# Patient Record
Sex: FEMALE | Race: WHITE | Employment: FULL TIME | URBAN - METROPOLITAN AREA
[De-identification: names, ages, dates, MRNs, and addresses within clinical notes are randomized per-mention and may not be internally consistent; named-entity substitution may affect disease eponyms.]

---

## 2018-09-05 ENCOUNTER — HOSPITAL ENCOUNTER (OUTPATIENT)
Age: 43
Discharge: HOME | End: 2018-09-05
Payer: COMMERCIAL

## 2018-09-05 LAB
TSH SERPL DL<=0.05 MIU/L-ACNC: 1.32 UIU/ML (ref 0.27–4.2)
VITAMIN D 25-HYDROXY: 32 NG/ML (ref 30–100)

## 2018-09-05 PROCEDURE — 82306 VITAMIN D 25 HYDROXY: CPT

## 2018-09-05 PROCEDURE — 84443 ASSAY THYROID STIM HORMONE: CPT

## 2018-09-05 PROCEDURE — 36415 COLL VENOUS BLD VENIPUNCTURE: CPT

## 2020-03-03 ENCOUNTER — OFFICE VISIT (OUTPATIENT)
Dept: PRIMARY CARE CLINIC | Age: 45
End: 2020-03-03
Payer: COMMERCIAL

## 2020-03-03 VITALS
BODY MASS INDEX: 22.16 KG/M2 | RESPIRATION RATE: 22 BRPM | HEART RATE: 90 BPM | DIASTOLIC BLOOD PRESSURE: 67 MMHG | WEIGHT: 133 LBS | TEMPERATURE: 98.3 F | HEIGHT: 65 IN | OXYGEN SATURATION: 99 % | SYSTOLIC BLOOD PRESSURE: 108 MMHG

## 2020-03-03 LAB
INFLUENZA A ANTIGEN, POC: NORMAL
INFLUENZA B ANTIGEN, POC: NORMAL

## 2020-03-03 PROCEDURE — G8420 CALC BMI NORM PARAMETERS: HCPCS | Performed by: NURSE PRACTITIONER

## 2020-03-03 PROCEDURE — G8427 DOCREV CUR MEDS BY ELIG CLIN: HCPCS | Performed by: NURSE PRACTITIONER

## 2020-03-03 PROCEDURE — 87804 INFLUENZA ASSAY W/OPTIC: CPT | Performed by: NURSE PRACTITIONER

## 2020-03-03 PROCEDURE — G8484 FLU IMMUNIZE NO ADMIN: HCPCS | Performed by: NURSE PRACTITIONER

## 2020-03-03 PROCEDURE — 99213 OFFICE O/P EST LOW 20 MIN: CPT | Performed by: NURSE PRACTITIONER

## 2020-03-03 PROCEDURE — 1036F TOBACCO NON-USER: CPT | Performed by: NURSE PRACTITIONER

## 2020-03-03 RX ORDER — PREDNISONE 20 MG/1
20 TABLET ORAL 2 TIMES DAILY
Qty: 10 TABLET | Refills: 0 | Status: SHIPPED | OUTPATIENT
Start: 2020-03-03 | End: 2020-03-08

## 2020-03-03 RX ORDER — OXYBUTYNIN CHLORIDE 15 MG/1
TABLET, EXTENDED RELEASE ORAL
COMMUNITY
Start: 2018-11-19

## 2020-03-03 RX ORDER — LEVOTHYROXINE SODIUM 100 MCG
88 TABLET ORAL
COMMUNITY
Start: 2020-01-27

## 2020-03-03 RX ORDER — VITAMIN B COMPLEX
1 CAPSULE ORAL DAILY
COMMUNITY

## 2020-03-03 RX ORDER — DOXYCYCLINE HYCLATE 100 MG
100 TABLET ORAL 2 TIMES DAILY
Qty: 20 TABLET | Refills: 0 | Status: SHIPPED | OUTPATIENT
Start: 2020-03-03 | End: 2020-03-13

## 2020-03-03 SDOH — HEALTH STABILITY: MENTAL HEALTH: HOW OFTEN DO YOU HAVE A DRINK CONTAINING ALCOHOL?: MONTHLY OR LESS

## 2020-03-03 ASSESSMENT — PATIENT HEALTH QUESTIONNAIRE - PHQ9
1. LITTLE INTEREST OR PLEASURE IN DOING THINGS: 0
SUM OF ALL RESPONSES TO PHQ QUESTIONS 1-9: 0
2. FEELING DOWN, DEPRESSED OR HOPELESS: 0
SUM OF ALL RESPONSES TO PHQ QUESTIONS 1-9: 0
SUM OF ALL RESPONSES TO PHQ9 QUESTIONS 1 & 2: 0

## 2020-03-03 NOTE — PROGRESS NOTES
Chief Complaint   Cough; Fatigue; Otalgia (right ear); Generalized Body Aches; and Fever (started 2 weeks ago)    History of Present Illness   Source of history provided by:  patient. Shai Akhtar is a 40 y.o. old female who has a past medical history of: No past medical history on file. Presents to the [facility] with complaints of a nonproductive cough, fever, body aches, chills, mild nausea, sore throat, and nasal congestion/drainage. She was on medrol dose pack starting the 19 of February as well as tessalon pearles. She tells me that the cough has continues since that time. She also reports that she has a low back pain. She has been taking nyquil at night to help with the cough. She denies shortness of breath. States symptoms have been present x 14 days. States symptoms have progressed since onset. Denies any CP, SOB, abdominal pain, vomiting, diarrhea, rash, or lethargy. Has been taking no other medication without symptomatic relief. Denies any hx of asthma, COPD, or tobacco use. ROS   Pertinent positives and negatives are stated within HPI, all other systems reviewed and are negative. No past surgical history on file. Social History:  reports that she has never smoked. She has never used smokeless tobacco. She reports previous alcohol use. She reports that she does not use drugs. Family History: family history is not on file. Allergies: Sulfa antibiotics    Physical Exam      VS:  /67 (Site: Right Upper Arm, Position: Sitting, Cuff Size: Medium Adult)   Pulse 90   Temp 98.3 °F (36.8 °C)   Resp 22   Ht 5' 5\" (1.651 m)   Wt 133 lb (60.3 kg)   SpO2 99%   Breastfeeding No   BMI 22.13 kg/m²    Oxygen Saturation Interpretation: Normal.    Constitutional:  Alert, development consistent with age. NAD. Head:  NC/NT. Airway patent. Ears: TMs wnl bilaterally. Canals without exudate or swelling bilaterally. Nose: mild congestion of the nasal mucosa with mild drainage.   Mouth:

## 2020-03-11 ENCOUNTER — OFFICE VISIT (OUTPATIENT)
Dept: PRIMARY CARE CLINIC | Age: 45
End: 2020-03-11
Payer: COMMERCIAL

## 2020-03-11 ENCOUNTER — HOSPITAL ENCOUNTER (OUTPATIENT)
Age: 45
Discharge: HOME | End: 2020-03-13
Payer: COMMERCIAL

## 2020-03-11 ENCOUNTER — HOSPITAL ENCOUNTER (OUTPATIENT)
Dept: GENERAL RADIOLOGY | Age: 45
Discharge: HOME | End: 2020-03-13
Payer: COMMERCIAL

## 2020-03-11 VITALS
DIASTOLIC BLOOD PRESSURE: 80 MMHG | HEART RATE: 80 BPM | BODY MASS INDEX: 22.16 KG/M2 | HEIGHT: 65 IN | WEIGHT: 133 LBS | SYSTOLIC BLOOD PRESSURE: 126 MMHG | TEMPERATURE: 98.2 F | OXYGEN SATURATION: 98 %

## 2020-03-11 PROCEDURE — 71046 X-RAY EXAM CHEST 2 VIEWS: CPT

## 2020-03-11 PROCEDURE — G8420 CALC BMI NORM PARAMETERS: HCPCS | Performed by: NURSE PRACTITIONER

## 2020-03-11 PROCEDURE — G8427 DOCREV CUR MEDS BY ELIG CLIN: HCPCS | Performed by: NURSE PRACTITIONER

## 2020-03-11 PROCEDURE — 99213 OFFICE O/P EST LOW 20 MIN: CPT | Performed by: NURSE PRACTITIONER

## 2020-03-11 PROCEDURE — 1036F TOBACCO NON-USER: CPT | Performed by: NURSE PRACTITIONER

## 2020-03-11 PROCEDURE — G8484 FLU IMMUNIZE NO ADMIN: HCPCS | Performed by: NURSE PRACTITIONER

## 2020-03-11 RX ORDER — FLUTICASONE FUROATE AND VILANTEROL TRIFENATATE 100; 25 UG/1; UG/1
1 POWDER RESPIRATORY (INHALATION) DAILY
Qty: 1 EACH | Refills: 1 | Status: SHIPPED
Start: 2020-03-11 | End: 2021-10-08

## 2021-01-21 DIAGNOSIS — N95.1 PERIMENOPAUSAL: ICD-10-CM

## 2021-01-21 DIAGNOSIS — R53.83 OTHER FATIGUE: ICD-10-CM

## 2021-01-21 DIAGNOSIS — E34.9 HORMONE IMBALANCE: ICD-10-CM

## 2021-01-21 DIAGNOSIS — E06.3 HASHIMOTO'S DISEASE: ICD-10-CM

## 2021-01-21 PROBLEM — R61 HYPERHIDROSIS: Status: ACTIVE | Noted: 2021-01-21

## 2021-01-21 LAB
CORTISOL TOTAL: 6.16 MCG/DL (ref 2.68–18.4)
ESTRADIOL LEVEL: 316.1 PG/ML
FOLLICLE STIMULATING HORMONE: 4.8 MIU/ML
PROLACTIN: 22 NG/ML
T3 FREE: 2.5 PG/ML (ref 2–4.4)
T4 FREE: 1.3 NG/DL (ref 0.93–1.7)
TSH SERPL DL<=0.05 MIU/L-ACNC: 1.59 UIU/ML (ref 0.27–4.2)

## 2021-01-23 LAB
DHEAS (DHEA SULFATE): 136 UG/DL (ref 32–240)
PROGESTERONE LEVEL: 0.19 NG/ML
SEX HORMONE BINDING GLOBULIN: 125 NMOL/L (ref 30–135)
TESTOSTERONE FREE-NONMALE: <1 PG/ML (ref 1.1–5.8)
TESTOSTERONE TOTAL: 13 NG/DL (ref 20–70)

## 2021-01-26 LAB
DIHYDROTESTOSTERONE: 87.2 PG/ML (ref 24–208)
PREGNENOLONE: 41 NG/DL (ref 15–132)

## 2021-05-17 DIAGNOSIS — R53.83 OTHER FATIGUE: ICD-10-CM

## 2021-05-17 DIAGNOSIS — N95.1 PERIMENOPAUSAL: ICD-10-CM

## 2021-05-17 DIAGNOSIS — E34.9 HORMONE IMBALANCE: ICD-10-CM

## 2021-05-17 DIAGNOSIS — E06.3 HASHIMOTO'S DISEASE: ICD-10-CM

## 2021-05-17 LAB
CORTISOL TOTAL: 6.77 MCG/DL (ref 2.68–18.4)
ESTRADIOL LEVEL: 195.7 PG/ML
FOLLICLE STIMULATING HORMONE: 5.5 MIU/ML
T3 FREE: 2.4 PG/ML (ref 2–4.4)
T4 FREE: 1.47 NG/DL (ref 0.93–1.7)
TSH SERPL DL<=0.05 MIU/L-ACNC: 1.35 UIU/ML (ref 0.27–4.2)
VITAMIN D 25-HYDROXY: 60 NG/ML (ref 30–100)

## 2021-05-19 LAB
PROGESTERONE LEVEL: 2.5 NG/ML
SEX HORMONE BINDING GLOBULIN: 119 NMOL/L (ref 30–135)
TESTOSTERONE FREE-NONMALE: 1.1 PG/ML (ref 1.1–5.8)
TESTOSTERONE TOTAL: 15 NG/DL (ref 20–70)

## 2021-05-20 LAB — DHEAS (DHEA SULFATE): 163 UG/DL (ref 32–240)

## 2021-05-21 LAB — PREGNENOLONE: 40 NG/DL (ref 15–132)

## 2022-07-23 PROBLEM — N92.6 IRREGULAR PERIODS: Status: ACTIVE | Noted: 2022-07-23

## 2022-07-23 PROBLEM — Z80.49 FAMILY HISTORY OF MALIGNANT NEOPLASM OF ENDOMETRIUM: Status: ACTIVE | Noted: 2022-07-23

## 2022-11-17 PROBLEM — E28.39 SUPPRESSION OF OVULATION: Status: ACTIVE | Noted: 2022-11-17
